# Patient Record
Sex: FEMALE | Race: BLACK OR AFRICAN AMERICAN | NOT HISPANIC OR LATINO | URBAN - METROPOLITAN AREA
[De-identification: names, ages, dates, MRNs, and addresses within clinical notes are randomized per-mention and may not be internally consistent; named-entity substitution may affect disease eponyms.]

---
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
## 2025-06-03 ENCOUNTER — HOSPITAL ENCOUNTER (EMERGENCY)
Facility: HOSPITAL | Age: 20
Discharge: HOME | End: 2025-06-03
Payer: MEDICAID

## 2025-06-03 VITALS
HEART RATE: 92 BPM | TEMPERATURE: 97.7 F | OXYGEN SATURATION: 100 % | RESPIRATION RATE: 16 BRPM | SYSTOLIC BLOOD PRESSURE: 125 MMHG | DIASTOLIC BLOOD PRESSURE: 81 MMHG

## 2025-06-03 DIAGNOSIS — Z3A.22 22 WEEKS GESTATION OF PREGNANCY (HHS-HCC): ICD-10-CM

## 2025-06-03 DIAGNOSIS — O99.612 CONSTIPATION DURING PREGNANCY IN SECOND TRIMESTER (HHS-HCC): ICD-10-CM

## 2025-06-03 DIAGNOSIS — K59.00 CONSTIPATION DURING PREGNANCY IN SECOND TRIMESTER (HHS-HCC): ICD-10-CM

## 2025-06-03 DIAGNOSIS — O23.42 URINARY TRACT INFECTION IN MOTHER DURING SECOND TRIMESTER OF PREGNANCY (HHS-HCC): Primary | ICD-10-CM

## 2025-06-03 DIAGNOSIS — O99.019 ANEMIA AFFECTING FIRST PREGNANCY: ICD-10-CM

## 2025-06-03 LAB
ABO GROUP (TYPE) IN BLOOD: NORMAL
ALBUMIN SERPL BCP-MCNC: 3.7 G/DL (ref 3.4–5)
ALP SERPL-CCNC: 45 U/L (ref 33–110)
ALT SERPL W P-5'-P-CCNC: 9 U/L (ref 7–45)
ANION GAP SERPL CALC-SCNC: 12 MMOL/L (ref 10–20)
ANTIBODY SCREEN: NORMAL
APPEARANCE UR: ABNORMAL
AST SERPL W P-5'-P-CCNC: 12 U/L (ref 9–39)
BACTERIA #/AREA URNS AUTO: ABNORMAL /HPF
BASOPHILS # BLD AUTO: 0.03 X10*3/UL (ref 0–0.1)
BASOPHILS NFR BLD AUTO: 0.2 %
BILIRUB SERPL-MCNC: 0.5 MG/DL (ref 0–1.2)
BILIRUB UR STRIP.AUTO-MCNC: NEGATIVE MG/DL
BUN SERPL-MCNC: 4 MG/DL (ref 6–23)
CALCIUM SERPL-MCNC: 9 MG/DL (ref 8.6–10.3)
CHLORIDE SERPL-SCNC: 104 MMOL/L (ref 98–107)
CO2 SERPL-SCNC: 21 MMOL/L (ref 21–32)
COLOR UR: ABNORMAL
CREAT SERPL-MCNC: 0.42 MG/DL (ref 0.5–1.05)
EGFRCR SERPLBLD CKD-EPI 2021: >90 ML/MIN/1.73M*2
EOSINOPHIL # BLD AUTO: 0.14 X10*3/UL (ref 0–0.7)
EOSINOPHIL NFR BLD AUTO: 1.1 %
ERYTHROCYTE [DISTWIDTH] IN BLOOD BY AUTOMATED COUNT: 14.4 % (ref 11.5–14.5)
GLUCOSE SERPL-MCNC: 89 MG/DL (ref 74–99)
GLUCOSE UR STRIP.AUTO-MCNC: NORMAL MG/DL
HCG UR QL IA.RAPID: POSITIVE
HCT VFR BLD AUTO: 31.6 % (ref 36–46)
HGB BLD-MCNC: 9.9 G/DL (ref 12–16)
HOLD SPECIMEN: NORMAL
IMM GRANULOCYTES # BLD AUTO: 0.09 X10*3/UL (ref 0–0.7)
IMM GRANULOCYTES NFR BLD AUTO: 0.7 % (ref 0–0.9)
KETONES UR STRIP.AUTO-MCNC: NEGATIVE MG/DL
LEUKOCYTE ESTERASE UR QL STRIP.AUTO: ABNORMAL
LYMPHOCYTES # BLD AUTO: 1.94 X10*3/UL (ref 1.2–4.8)
LYMPHOCYTES NFR BLD AUTO: 15.2 %
MAGNESIUM SERPL-MCNC: 1.53 MG/DL (ref 1.6–2.4)
MCH RBC QN AUTO: 27.5 PG (ref 26–34)
MCHC RBC AUTO-ENTMCNC: 31.3 G/DL (ref 32–36)
MCV RBC AUTO: 88 FL (ref 80–100)
MONOCYTES # BLD AUTO: 0.86 X10*3/UL (ref 0.1–1)
MONOCYTES NFR BLD AUTO: 6.8 %
NEUTROPHILS # BLD AUTO: 9.67 X10*3/UL (ref 1.2–7.7)
NEUTROPHILS NFR BLD AUTO: 76 %
NITRITE UR QL STRIP.AUTO: NEGATIVE
NRBC BLD-RTO: 0 /100 WBCS (ref 0–0)
PH UR STRIP.AUTO: 7 [PH]
PLATELET # BLD AUTO: 285 X10*3/UL (ref 150–450)
POTASSIUM SERPL-SCNC: 3.6 MMOL/L (ref 3.5–5.3)
PROT SERPL-MCNC: 7.1 G/DL (ref 6.4–8.2)
PROT UR STRIP.AUTO-MCNC: NEGATIVE MG/DL
RBC # BLD AUTO: 3.6 X10*6/UL (ref 4–5.2)
RBC # UR STRIP.AUTO: NEGATIVE MG/DL
RBC #/AREA URNS AUTO: ABNORMAL /HPF
RH FACTOR (ANTIGEN D): NORMAL
SODIUM SERPL-SCNC: 133 MMOL/L (ref 136–145)
SP GR UR STRIP.AUTO: 1
SQUAMOUS #/AREA URNS AUTO: ABNORMAL /HPF
UROBILINOGEN UR STRIP.AUTO-MCNC: NORMAL MG/DL
WBC # BLD AUTO: 12.7 X10*3/UL (ref 4.4–11.3)
WBC #/AREA URNS AUTO: ABNORMAL /HPF

## 2025-06-03 PROCEDURE — 81025 URINE PREGNANCY TEST: CPT | Performed by: NURSE PRACTITIONER

## 2025-06-03 PROCEDURE — 36415 COLL VENOUS BLD VENIPUNCTURE: CPT | Performed by: NURSE PRACTITIONER

## 2025-06-03 PROCEDURE — 96361 HYDRATE IV INFUSION ADD-ON: CPT

## 2025-06-03 PROCEDURE — 86901 BLOOD TYPING SEROLOGIC RH(D): CPT | Performed by: NURSE PRACTITIONER

## 2025-06-03 PROCEDURE — 2500000004 HC RX 250 GENERAL PHARMACY W/ HCPCS (ALT 636 FOR OP/ED): Performed by: NURSE PRACTITIONER

## 2025-06-03 PROCEDURE — 96360 HYDRATION IV INFUSION INIT: CPT

## 2025-06-03 PROCEDURE — 87086 URINE CULTURE/COLONY COUNT: CPT | Mod: PARLAB | Performed by: NURSE PRACTITIONER

## 2025-06-03 PROCEDURE — 83735 ASSAY OF MAGNESIUM: CPT | Performed by: NURSE PRACTITIONER

## 2025-06-03 PROCEDURE — 80053 COMPREHEN METABOLIC PANEL: CPT | Performed by: NURSE PRACTITIONER

## 2025-06-03 PROCEDURE — 99284 EMERGENCY DEPT VISIT MOD MDM: CPT | Mod: 25

## 2025-06-03 PROCEDURE — 85025 COMPLETE CBC W/AUTO DIFF WBC: CPT | Performed by: NURSE PRACTITIONER

## 2025-06-03 PROCEDURE — 2500000001 HC RX 250 WO HCPCS SELF ADMINISTERED DRUGS (ALT 637 FOR MEDICARE OP): Performed by: NURSE PRACTITIONER

## 2025-06-03 PROCEDURE — 81001 URINALYSIS AUTO W/SCOPE: CPT | Performed by: NURSE PRACTITIONER

## 2025-06-03 RX ORDER — CEPHALEXIN 500 MG/1
500 CAPSULE ORAL 2 TIMES DAILY
Qty: 14 CAPSULE | Refills: 0 | Status: SHIPPED | OUTPATIENT
Start: 2025-06-03 | End: 2025-06-10

## 2025-06-03 RX ORDER — CEPHALEXIN 500 MG/1
500 CAPSULE ORAL ONCE
Status: COMPLETED | OUTPATIENT
Start: 2025-06-03 | End: 2025-06-03

## 2025-06-03 RX ORDER — LANOLIN ALCOHOL/MO/W.PET/CERES
400 CREAM (GRAM) TOPICAL DAILY
Status: DISCONTINUED | OUTPATIENT
Start: 2025-06-03 | End: 2025-06-03 | Stop reason: HOSPADM

## 2025-06-03 RX ORDER — POLYETHYLENE GLYCOL 3350 17 G/17G
17 POWDER, FOR SOLUTION ORAL DAILY
Qty: 14 PACKET | Refills: 0 | Status: SHIPPED | OUTPATIENT
Start: 2025-06-03 | End: 2025-06-17

## 2025-06-03 RX ADMIN — MAGNESIUM OXIDE TAB 400 MG (241.3 MG ELEMENTAL MG) 1 TABLET: 400 (241.3 MG) TAB at 13:43

## 2025-06-03 RX ADMIN — CEPHALEXIN 500 MG: 500 CAPSULE ORAL at 13:43

## 2025-06-03 RX ADMIN — SODIUM CHLORIDE, SODIUM LACTATE, POTASSIUM CHLORIDE, AND CALCIUM CHLORIDE 1000 ML: .6; .31; .03; .02 INJECTION, SOLUTION INTRAVENOUS at 12:00

## 2025-06-03 NOTE — ED TRIAGE NOTES
Patient is  estimated 21 weeks with no previous prenatal care presents today reporting constipation. Last bowel movement last week. Denies any vaginal bleeding.

## 2025-06-03 NOTE — ED PROVIDER NOTES
Emergency Department Provider Note        History of Present Illness     History provided by: Patient  Limitations to History: None    HPI:  Kavita Iglesias is a 19 y.o. female with no significant past medical history reported presents emergency department today due to constipation.  Patient states that she currently about 21 weeks pregnant.  Her last menstrual period was 12-.  Based on this she is 22 weeks pregnant with an NIMCO of 10 - 3 - 25.  Patient is G1, P0.  Patient is complaining of constipation.  Patient states she has been having difficulty with bowel movements for the past few weeks.  She had 2 bowel movements last week and this week she has had to disimpact herself.  She also feels kind of dehydrated.  She denies any abdominal pain, vaginal bleeding, vaginal discharge.  She is sexually active with 1 partner.  She denies any gush of fluid or mucus discharge.  She denies any other complaints.    Physical Exam   Triage vitals:  T 36.5 °C (97.7 °F)  HR (!) 107  /81  RR 18  O2 100 % None (Room air)    Physical Exam  Vitals and nursing note reviewed.   Constitutional:       General: She is not in acute distress.     Appearance: She is well-developed.   HENT:      Head: Normocephalic and atraumatic.   Eyes:      Conjunctiva/sclera: Conjunctivae normal.   Cardiovascular:      Rate and Rhythm: Normal rate and regular rhythm.      Heart sounds: No murmur heard.  Pulmonary:      Effort: Pulmonary effort is normal. No respiratory distress.      Breath sounds: Normal breath sounds.   Abdominal:      Palpations: Abdomen is soft.      Tenderness: There is no abdominal tenderness.   Musculoskeletal:         General: No swelling.      Cervical back: Neck supple.   Skin:     General: Skin is warm and dry.      Capillary Refill: Capillary refill takes less than 2 seconds.   Neurological:      Mental Status: She is alert.   Psychiatric:         Mood and Affect: Mood normal.          Medical Decision  Making & ED Course   Medical Decision Makin y.o. female with no significant past medical history reported presents emergency department today due to constipation.  Patient states that she currently about 21 weeks pregnant.  Her last menstrual period was 2024.  Based on this she is 22 weeks pregnant with an NIMCO of 10 - 3 - 25.  Patient is G1, P0.  Patient is complaining of constipation.  Patient states she has been having difficulty with bowel movements for the past few weeks.  She had 2 bowel movements last week and this week she has had to disimpact herself.  She also feels kind of dehydrated.  She denies any abdominal pain, vaginal bleeding, vaginal discharge.  She is sexually active with 1 partner.  She denies any gush of fluid or mucus discharge.  She denies any other complaints.  Given patient's lack of prenatal care I did obtain basic lab work and urinalysis as well as urine pregnancy test.  Patient's urinalysis was positive for UTI.  She was treated with oral antibiotics for this.  Lab work did show her to have a slight hypomagnesemia which was repleted orally in the ED.  Patient also found to be anemic.  She was started on prenatal vitamins.  I did give her referral to OB/GYN advised her to follow-up as soon as possible.  Patient's fetal heart tones are normal.  She is still feeling fetal movement.  She was given IV fluids for slightly elevated heart rate consistent with mild dehydration.  She was able to move her bowels after this.  She was given MiraLAX as needed for potential constipation in the future.  ----      Differential diagnoses considered include but are not limited to: Constipation, HELLP, UTI, dehydration, pregnancy.     Social Determinants of Health which Significantly Impact Care: None identified     EKG Independent Interpretation: EKG not obtained    Independent Result Review and Interpretation: Relevant laboratory and radiographic results were reviewed and independently  interpreted by myself.  As necessary, they are commented on in the ED Course.    Chronic conditions affecting the patient's care: As documented above in [facility]    The patient was discussed with the following consultants/services: None    Care Considerations: As documented above in [facility]    ED Course:  ED Course as of 06/03/25 1421   Tue Jun 03, 2025   1230 Magnesium(!)  Slight hypomagnesemia, will replete orally [WS]   1230 Comprehensive Metabolic Panel(!)  CMP is stable, no electrolyte abnormalities, metabolic disorder, kidney injury, or elevated liver enzymes consistent with liver pathology. [WS]   1230 CBC and Auto Differential(!)  Leukocytosis, mild anemia, normal platelets. [WS]   1336 Patient's fetal heart tones were obtained and are normal. [WS]   1336 Microscopic Only, Urine(!)  UA concerning for UTI, [WS]   1348 hCG, Urine, Qualitative(!)  Patient is confirmed. [WS]      ED Course User Index  [WS] CATHERINE Cabrera         Diagnoses as of 06/03/25 1421   Urinary tract infection in mother during second trimester of pregnancy ([facility])   Constipation during pregnancy in second trimester ([facility])   Anemia affecting first pregnancy   22 weeks gestation of pregnancy ([facility])     Disposition   As a result of the work-up, the patient was discharged home.  she was informed of her diagnosis and instructed to come back with any concerns or worsening of condition.  she and was agreeable to the plan as discussed above.  she was given the opportunity to ask questions.  All of the patient's questions were answered.    Procedures   Procedures    Patient was seen independently    CATHERINE Cabrera  Emergency Medicine     CATHERINE Cabrera  06/03/25 1422

## 2025-06-03 NOTE — ED TRIAGE NOTES
TRIAGE NOTE   I saw the patient as the Clinician in Triage and performed a brief history and physical exam, established acuity, and ordered appropriate tests to develop basic plan of care. Patient will be seen by an [facility], resident and/or physician who will independently evaluate the patient. Please see subsequent provider notes for further details and disposition.     Brief HPI: In brief, Kavita Iglesias is a 19 y.o. female who is a G1, P0 approximately 21 weeks pregnant with no prenatal care presenting to ED today from home for evaluation of constipation.  Has had issues with constipation for the last 2 weeks, last bowel movement 5 days ago.  Denies abdominal/back pain, passing gas.  Positive fetal movement.  No loss of fluid, vaginal discharge or vaginal bleeding.  Denies fever/chills, cough/cold symptoms, chest pain, shortness of breath, nausea/vomiting, abdominal pain, urinary symptoms, change in bowel habits or any other complaints.  No smoking, EtOH or IV drugs.  Does not follow with OB yet.    Focused Physical exam:   General: 19-year-old female, awake and alert, orient x 3.  Well-nourished and hydrated.  Nontoxic looking.  Skin: Pink, warm and dry.  Cardiac: Mildly tachycardic at 107.  Pulmonary: Clear bilaterally.  Abdomen: Gravid uterus, soft, bowel sounds x 4.  Nontender.  No CVA tenderness.    Plan/MDM:   19-year-old approximately 21-week pregnant  was evaluated at the bedside for constipation, last normal bowel movement about 5 days ago.  Is passing gas, no abdominal pain.  Positive fetal movement, no loss of fluid or vaginal bleeding.  Has not followed with OB.  Vital signs within normal limits.  Afebrile.  Abdomen soft and nontender with bowel sounds.  Will obtain fetal heart tones.  Patient is agreeable to this plan.    Please see subsequent provider note for further details and disposition

## 2025-06-04 LAB — BACTERIA UR CULT: NORMAL

## 2025-06-19 ENCOUNTER — APPOINTMENT (OUTPATIENT)
Dept: PEDIATRIC HEMATOLOGY/ONCOLOGY | Facility: HOSPITAL | Age: 20
End: 2025-06-19
Payer: MEDICAID

## 2025-06-23 ENCOUNTER — APPOINTMENT (OUTPATIENT)
Dept: PRIMARY CARE | Facility: CLINIC | Age: 20
End: 2025-06-23
Payer: MEDICAID

## 2025-06-30 ENCOUNTER — APPOINTMENT (OUTPATIENT)
Dept: OBSTETRICS AND GYNECOLOGY | Facility: CLINIC | Age: 20
End: 2025-06-30
Payer: MEDICAID

## 2025-06-30 ENCOUNTER — APPOINTMENT (OUTPATIENT)
Facility: CLINIC | Age: 20
End: 2025-06-30
Payer: MEDICAID

## 2025-07-01 ENCOUNTER — APPOINTMENT (OUTPATIENT)
Dept: PRIMARY CARE | Facility: CLINIC | Age: 20
End: 2025-07-01
Payer: MEDICAID

## 2025-07-14 ENCOUNTER — APPOINTMENT (OUTPATIENT)
Dept: LAB | Facility: HOSPITAL | Age: 20
End: 2025-07-14
Payer: MEDICAID

## 2025-07-14 ENCOUNTER — APPOINTMENT (OUTPATIENT)
Dept: OBSTETRICS AND GYNECOLOGY | Facility: CLINIC | Age: 20
End: 2025-07-14
Payer: MEDICAID

## 2025-07-14 VITALS
DIASTOLIC BLOOD PRESSURE: 42 MMHG | BODY MASS INDEX: 40.42 KG/M2 | WEIGHT: 250.44 LBS | HEART RATE: 106 BPM | SYSTOLIC BLOOD PRESSURE: 99 MMHG

## 2025-07-14 DIAGNOSIS — Z36.0 ENCOUNTER FOR ANTENATAL SCREENING FOR CHROMOSOMAL ANOMALIES: ICD-10-CM

## 2025-07-14 DIAGNOSIS — D50.9 IRON DEFICIENCY ANEMIA DURING PREGNANCY: ICD-10-CM

## 2025-07-14 DIAGNOSIS — Z34.02 ENCOUNTER FOR SUPERVISION OF NORMAL PRIMIGRAVIDA IN SECOND TRIMESTER, ANTEPARTUM: Primary | ICD-10-CM

## 2025-07-14 DIAGNOSIS — Z34.92: ICD-10-CM

## 2025-07-14 DIAGNOSIS — O99.019 IRON DEFICIENCY ANEMIA DURING PREGNANCY: ICD-10-CM

## 2025-07-14 LAB
ERYTHROCYTE [DISTWIDTH] IN BLOOD BY AUTOMATED COUNT: 15.9 % (ref 11.5–14.5)
HCT VFR BLD AUTO: 31.7 % (ref 36–46)
HGB BLD-MCNC: 9.5 G/DL (ref 12–16)
MCH RBC QN AUTO: 27.2 PG (ref 26–34)
MCHC RBC AUTO-ENTMCNC: 30 G/DL (ref 32–36)
MCV RBC AUTO: 91 FL (ref 80–100)
NRBC BLD-RTO: 0 /100 WBCS (ref 0–0)
PLATELET # BLD AUTO: 290 X10*3/UL (ref 150–450)
RBC # BLD AUTO: 3.49 X10*6/UL (ref 4–5.2)
WBC # BLD AUTO: 11.4 X10*3/UL (ref 4.4–11.3)

## 2025-07-14 PROCEDURE — 99204 OFFICE O/P NEW MOD 45 MIN: CPT | Performed by: NURSE PRACTITIONER

## 2025-07-14 PROCEDURE — H1000 PRENATAL CARE ATRISK ASSESSM: HCPCS | Performed by: NURSE PRACTITIONER

## 2025-07-14 PROCEDURE — 86901 BLOOD TYPING SEROLOGIC RH(D): CPT

## 2025-07-14 PROCEDURE — 82746 ASSAY OF FOLIC ACID SERUM: CPT

## 2025-07-14 PROCEDURE — 85027 COMPLETE CBC AUTOMATED: CPT

## 2025-07-14 PROCEDURE — 83550 IRON BINDING TEST: CPT

## 2025-07-14 PROCEDURE — 82607 VITAMIN B-12: CPT

## 2025-07-14 PROCEDURE — 82728 ASSAY OF FERRITIN: CPT

## 2025-07-14 PROCEDURE — 86900 BLOOD TYPING SEROLOGIC ABO: CPT

## 2025-07-14 PROCEDURE — 83021 HEMOGLOBIN CHROMOTOGRAPHY: CPT

## 2025-07-14 PROCEDURE — 99214 OFFICE O/P EST MOD 30 MIN: CPT | Performed by: NURSE PRACTITIONER

## 2025-07-14 PROCEDURE — 86850 RBC ANTIBODY SCREEN: CPT

## 2025-07-14 RX ORDER — NAPROXEN SODIUM 220 MG/1
162 TABLET, FILM COATED ORAL DAILY
Qty: 60 TABLET | Refills: 5 | Status: SHIPPED | OUTPATIENT
Start: 2025-07-14

## 2025-07-14 ASSESSMENT — EDINBURGH POSTNATAL DEPRESSION SCALE (EPDS)
TOTAL SCORE: 14
I HAVE LOOKED FORWARD WITH ENJOYMENT TO THINGS: AS MUCH AS I EVER DID
I HAVE BEEN ABLE TO LAUGH AND SEE THE FUNNY SIDE OF THINGS: AS MUCH AS I ALWAYS COULD
I HAVE BLAMED MYSELF UNNECESSARILY WHEN THINGS WENT WRONG: YES, SOME OF THE TIME
I HAVE FELT SCARED OR PANICKY FOR NO GOOD REASON: YES, SOMETIMES
I HAVE BEEN SO UNHAPPY THAT I HAVE HAD DIFFICULTY SLEEPING: NOT VERY OFTEN
THE THOUGHT OF HARMING MYSELF HAS OCCURRED TO ME: NEVER
THINGS HAVE BEEN GETTING ON TOP OF ME: YES, SOMETIMES I HAVEN'T BEEN COPING AS WELL AS USUAL
I HAVE FELT SAD OR MISERABLE: YES, MOST OF THE TIME
I HAVE BEEN SO UNHAPPY THAT I HAVE BEEN CRYING: YES, QUITE OFTEN
I HAVE BEEN ANXIOUS OR WORRIED FOR NO GOOD REASON: YES, SOMETIMES

## 2025-07-14 ASSESSMENT — PAIN - FUNCTIONAL ASSESSMENT: PAIN_FUNCTIONAL_ASSESSMENT: 0-10

## 2025-07-14 ASSESSMENT — PAIN SCALES - GENERAL: PAINLEVEL_OUTOF10: 0 - NO PAIN

## 2025-07-14 NOTE — LETTER
7/14/2025    To Whom It May Concern:    This is to certify that my patient,Kavita Iglesias is under my care for her pregnancy.    The following guidelines may be used for any dental work:  You may use a local anesthetic with or without Epinephrine.  You may prescribe any Penicillin or Cephalosporin if an antibiotic is necessary. (Dependent on allergy history)  You may take x-rays with a lead apron if necessary.  You may prescribe Tylenol and/or narcotics for pain.  You may extract teeth if necessary.    If you need further information or if you have any concerns, please contact our office.    Sincerely,        Zora Menjivar, APRN-CNM, APRN-CNP   [facility] [place]

## 2025-07-14 NOTE — LETTER
7/14/2025    To Whom It May Concern:    Kavita Iglesias is being followed for her pregnancy at [facility].  Estimated Date of Delivery: 11/2/25    Sincerely,        Zora Menjivar, APRN-CNM, APRN-CNP  [facility]

## 2025-07-14 NOTE — PATIENT INSTRUCTIONS
TAKING GOOD CARE OF YOURSELF WHILE YOU ARE PREGNANT    What Should I Eat?  You do not have to eat a lot more food during pregnancy. But it is important to eat the right food--the most  healthy food for you and your baby. Every day, make sure you have:  6 to 8 large glasses of water.  6 to 9 servings of whole grain foods like bread or pasta. By reading the label, you will know that you are getting ‘‘whole’’ grain and not just brown-colored bread or pasta (1 slice of bread or a half cup of cooked pasta is a serving).  3 to 4 servings of fruit. Fresh, raw fruit is best (1 small apple or a half cup of chopped fruit is a serving).  4 to 5 servings of vegetables (1 medium carrot or a half cup of chopped vegetables is a serving).  2 to 3 servings of lean meat, fish, eggs, or nuts. (A piece ofmeat the size of a pack of playing cards is 1 serving.)  1 serving of vitamin C-rich food, like oranges, sweet peppers, or tomatoes (one half cup is a serving).  2 to 3 servings of iron-rich foods, like black-eyed peas, sweet potatoes, greens, dried fruit, or meat.  1 serving of a food rich in folic acid, like dark green, leafy vegetables (one half cup is a serving).    Are Some Foods Dangerous?  Most women can eat any food they want while they are pregnant. But there are some foods that can be  dangerous to the health of your baby.    Fish -- Fish is good food. And it is an important food for growing a smart baby. But some fish have lots of dangerous chemicals. To avoid these chemicals:  Do not eat swordfish, shark, akbar mackerel, or tilefish.  Eat salmon no more than 1 time per week.  Eat only ‘‘light’’ tuna. Do not eat albacore tuna.    Milk and cheese -- Dairy products are an important source of calcium, and calcium helps build strong bones and teeth. But some dairy products carry dangerous germs. To keep yourself and your baby safe, eat and drink only dairy products--such as milk, yogurt, and cheese--that are  pasteurized.    Prepared foods -- Any food that is spoiled or not cooked well can make you sick.  Do not eat any meat or fish that has not been cooked all the way through.  Do not eat any cooked food that has not been kept hot or chilled.  Wash knives, cutting boards, and your hands between handling raw meat and any other food--like fruits and vegetables--that you plan to eat raw.  Wash all fruits and vegetables with 1 tablespoon of vinegar in a pan of water to kill germs before you eat them.    Alcohol -- We know that alcohol is dangerous for your baby if you drink a lot during your pregnancy. It is safest to avoid all alcohol.    Coffee -- The most recent studies say that 2 cups of caffeinated drink each day is safe during pregnancy. This means 2 small cups of coffee or tea or 1 can of caffeinated soda.    Weight Gain  On average, the total amount of weight gain during pregnancy will fall in the following ranges:    Weight Type Average Pounds   Normal weight (BMI 18.5 - 24.9) 25 - 35 pounds   Underweight (BMI less than 18.5) 28 - 40 pounds   Overweight (BMI 25 - 29.9) 15 - 25 pounds   Obese (BMI greater or equal to 30) 11 - 20 pounds       Do I Need to Take Vitamins?  Even if your diet is good, a daily multivitamin is a good idea. All prenatal vitamins are pretty much the same,  so buy the cheapest kind. If you find that your vitamins upset your stomach, take a children’s chewable or gummy  vitamin. Be sure you get at least 400 micrograms of folic acid every day in the vitamin you chose. The number  of micrograms of folic acid is on the label of the bottle.    Is Exercise Important?  Yes! You are getting ready for an athletic event: labor! Daily exercise will help you stay fit, control your  weight, and be prepared for labor. Every day, try to get at least 30 minutes of moderate exercise like walking  or swimming. Do deep squats several times a day. This exercise will help control low back pain and help  prepare  your pelvis for delivery.    Are Some Exercises Dangerous?  You can continue to do pretty much any exercise you have been doing. It is important to avoid any danger of  blows to your stomach. You should avoid scuba diving and contact sports.    What if I Get Sick--Can I Take Medicine?  It is important to limit the medicines you take as much as possible. It is safe to take acetaminophen  (Tylenol). Avoid NSAIDs like ibuprofen (Motrin) and naproxen (Aleve).    Head cold -- Drink lots of fluids, gargle with warm salt water, take warm baths or showers, take Tylenol for headache and sore throat, suck on throat lozenges    Headaches -- Drink at least 8 big glasses of water every day, eat something healthy every 2 to 3 hours during the day, and take Tylenol    Constipation -- Drink lots of water, eat lots of fruits and vegetables, including dried fruits like prunes, and use a fiber supplement like Metamucil    Are There Danger Signs That I Need to Watch Out For?  Call your health care provider if:  You start to bleed like a period  You are leaking fluid  Your baby is not moving (after 24 weeks into your pregnancy)  You are having very bad headaches or your vision is blurry or you see ‘‘spots’’  You are having very bad pain  You are feeling very frightened or sad  You are very worried about something    Our contact information is below in case you or your family need to call:  Your health care provider’s name: Zora KOLBY Menjivar, APRN-CNM, APRN-CNP  Your health care provider’s phone number: [phone]     What is a Midwife?    Midwives in the [place] provide health care services to women of all ages.Midwifemeans “with woman.”  Midwives partner with women to help make important health decisions. Midwives work with other members  of a woman’s health care team when needed, but a midwife may also be your primary care provider. There are  3 main types of midwives, and there are some differences in the services offered  by each type of midwife. At [facility], our practice consists of Certified-Nurse Midwives.    Certified Nurse-Midwives (CNMs)  Have a college degree in nursing and a master’s degree in nurse-midwifery. CNMs are registered nurses (RNs)  who have graduated from an accredited nurse-midwifery education programand passed a national certification  exam. They must have a license to practice midwifery in the state where they work. CNMs work in all health  care settings including hospitals, birth centers, and offices or clinics. CNMs provide general women’s health care  throughout a woman’s lifetime, and they can prescribe most medications.    What do midwives do?  Midwives provide care to women during pregnancy, labor, birth, and the postpartum period. They also provide general health services, annual checkups, contraception (birth control), menopausal care, and treatment for common infections and health problems. Midwives care for about one of every 10 women who give birth each year in the [place]. Most of these births are in hospitals.     Why would I choose a midwife for care during my pregnancy?  Midwives view pregnancy and birth as normal life events that can be a healthy time in your life. Midwives are experts in knowing the difference between normal changes that occur and symptoms that require extra attention. Midwives specialize in providing support and education in addition to regular health care. They use evidence-based medical procedures when there is a specific concern for the health of you or your baby, and they work in partnership with physicians who can be available if needed. Midwives offer health care that respects the goals and choices of each individual woman and family.    What if I have a high-risk pregnancy or complication during labor?  Your CNM can prescribe medicine, order tests, and provide treatment for common illnesses that you might get during pregnancy. Midwives work with  "physicians who specialize in complications of pregnancy. If you have a medical problem during pregnancy or complication during labor, your midwife will work with a physician to make sure you get the best and safest care for you and your baby. Midwives do not perform surgery. If you need to have a  birth, the surgery will be done by the physician who works with your midwife. Your midwife will also work with other health care providers: nurses, social workers, nutritionists, doulas, childbirth educators, physical therapists, and other specialists to help you get the care you need.    What if I want pain medicine during labor?  Your midwife will help you make decisions about how you are going to cope with your labor. If you want medicine to cope with pain during labor, your midwife can help you get medicine that is available in the setting  where you give birth. Midwives also know other ways help women cope with labor such as changing positions or being in a tub of water. These can be helpful in addition to pain medications.      Choices for care and hospital for birth:  The Certified Nurse Midwives (CNMs) at [facility] practice in a group setting, which means that any of the midwives in our group practice may be there for your birth.    We care for healthy females during pregnancy and labor/birth.  We practice in collaboration with physicians within our group.  If there are any concerns with your pregnancy, labor or birth, our physicians work closely with us.       There are certain medical conditions that \"risk you out\" of midwifery care that we are constantly assessing for.  Some conditions during your pregnancy that would risk you out of midwifery care would be:   Severe growth restriction of your baby   Labor/Birth  less than 35 weeks   Severe pre-eclampsia at any time during your pregnancy/labor/birth   Gestational Diabetes needing medication (insulin) to control your blood sugars   If " you decline or do not complete your glucose testing to rule out diet controlled diabetes by 32 weeks   If you are diagnosed with chronic hypertension and need to start medication to manage high blood pressures     We provide  Certified Nurse Midwifery coverage with a board certified OB/GYN, in house anesthesia and neonataology.            The Midwifery Service at [facility] has a Certified Nurse Midwife on call  who is available to discuss any pregnancy related concerns or urgent needs that cannot wait until the next business day.  Please call the office where you are seen at during the day.  On call numbers for after hours are listed below.    At any time you would like to tour our facilities, please call [phone] to set up a tour    If you are  (less than 37) weeks and experience:     More than 5 contractions in an 1 hour period   If you have fluid leaking from your vagina   Bleeding that is as heavy as a period   Decreased fetal movements or changes in your baby's movement after 24 weeks   A headache that does not go away with Tylenol or rest    If you are full term (37 weeks or greater) and experience:     Contractions that are 5 minutes a part for 2 hours that you cannot walk or talk through   A gush of fluid from your vagina   Bleeding that is as heavy as a period   Decrease fetal movements or changes in your baby's movements   A headache that does not go away with Tylenol or rest     During the weekday office hours please call the office you are normally seen at.    After hours please call:  For patients planning on birthing at [facility] or [facility] ([facility]) and need to speak to the midwife on call:  [phone]    For patients planning on birthing at [facility] and need to speak to the the midwife on call:  [phone]    [employer] Messages are not monitored . Please don't use this method of communication for any urgent or emergent concerns.

## 2025-07-14 NOTE — PROGRESS NOTES
Subjective   Kavita Iglesias is a 19 y.o.  at 24w1d with a working estimated date of delivery of 2025, by approximate Last Menstrual Period who presents for an initial prenatal visit. This pregnancy is unplanned but accepted..    Patient currently experiencing: constipation  Bleeding or cramping since LMP: no  Taking prenatal vitamin: Yes  Other concerns today: none  Ultrasound completed this pregnancy: No  Last pap: N/A    OB History    Para Term  AB Living   1        SAB IAB Ectopic Multiple Live Births             # Outcome Date GA Lbr Ken/2nd Weight Sex Type Anes PTL Lv   1 Current              Prior pregnancy complications: none  History of hypertension:  No    Medical History[1]   Surgical History[2]   Social History[3]   [place]  Depression Scale Total: 14    Objective   Physical Exam  Weight: 114 kg (250 lb 7 oz)  TW.8 kg (30 lb 7 oz)   Pregravid BMI: 35.53  BP: (!) 99/42    Physical Exam  Constitutional:       Appearance: Normal appearance.   Cardiovascular:      Rate and Rhythm: Normal rate and regular rhythm.   Pulmonary:      Effort: Pulmonary effort is normal.      Breath sounds: Normal breath sounds.   Abdominal:      Palpations: Abdomen is soft.      Tenderness: There is no abdominal tenderness.   Neurological:      General: No focal deficit present.      Mental Status: She is alert and oriented to person, place, and time. Mental status is at baseline.   Skin:     General: Skin is warm and dry.   Psychiatric:         Mood and Affect: Mood normal.         Behavior: Behavior normal.         Thought Content: Thought content normal.         Judgment: Judgment normal.          Assessment   Problem List Items Addressed This Visit          Pregnancy    Currently pregnant in second trimester with unknown gestational age ([facility])    Overview   Desired provider in labor: [] CNM (Birth Tracks [] )   [] Physician   [] Either Acceptable  [] Blood Products: [] Yes,  accepts [] No, needs counseling  [x] Initial BMI: 35.53   [] Prenatal Labs:   [x] Rh status: +  [] Screen for IPV and Substance Use Risk:  [] Genetic Screening (cfDNA):    [] First Trimester Anatomy Screen (11-13.6 wks):  [] Baby ASA (initiated):  [] Pregnancy dated by:      [] Anatomy US: (19-20 wks)  [] Federal Sterilization consent signed (if indicated):  [] 1hr GCT at 24-28wks:  [] Rhogam (if indicated):   [] Fetal Surveillance (if indicated):  [] Tdap (27-32 wks, may be given up to 36 wks if initial window missed):   [] RSV (32-36 wks) (Sept. to end ):      [] Feeding Intentions:  [] Pain management during labor:  [] Postpartum Birth control method:   [] GBS at 36 - 37 wks:  [] 39 weeks discussion of IOL vs. Expectant management:  [] Mode of delivery ( anticipated ):            Other Visit Diagnoses         Encounter for supervision of normal primigravida in second trimester, antepartum    -  Primary    Relevant Medications    prenatal vitamin, iron-folic, 27 mg iron-800 mcg folic acid tablet    aspirin 81 mg chewable tablet    Other Relevant Orders    US MAC OB imaging order    CBC Anemia Panel With Reflex    Hep B Core Ab, Total    Hep B surface Ag    Hep B Surface Ab    Hep C Ab    HIV 1/2 Screen with Reflex    Hgb  A1C    Hemoglobin identification with path review    Rubella Ab, IgG    Syphilis Screen with Reflex    Urine Culture clinic collect    C. trachomatis / N. gonorrhoeae, Amplified, Urogenital    Type And Screen Is this order related to pregnancy or an upcoming surgery? No      Encounter for  screening for chromosomal anomalies        Relevant Orders    Myriad Foresight Carrier Screen    Myriad Prequel Prenatal Screen             Plan   - New OB resources provided and reviewed with particular attention to dietary, travel, and medication restrictions  - Oriented to practice, CNM vs. MD care  - Reviewed IOM recommendations for weight gain given pt's BMI: 11-20 pounds (BMI greater than  or equal to 30)  - Reviewed bleeding precautions, warning signs, when to call provider; phone number provided  - Discussed bASA for PEC prophylaxis  - The following Rx were sent to pharmacy: PNV, bASA  - Routine NOB labs ordered  - NIPT discussed with patient: patient desires. Pre-test genetic counseling discussed and included:   Interpretation of family and medical histories to assess the probability of disease occurrence or recurrence;   Education about inheritance, genetic testing, disease management, prevention and resources  Counseling to promote informed choices and adaptation to the risk or presented of a genetic condition  Counseling for psychological aspects of genetic testing.  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 No pertinent surgical history.  [3]   Social History  Socioeconomic History    Marital status: Single     Spouse name: Venkatesh Lopez Jr.    Highest education level: Some college, no degree   Occupational History    Occupation: college student   Tobacco Use    Smoking status: Never    Smokeless tobacco: Never   Vaping Use    Vaping status: Never Used   Substance and Sexual Activity    Alcohol use: Never    Drug use: Never    Sexual activity: Yes     Partners: Male

## 2025-07-15 ENCOUNTER — HOSPITAL ENCOUNTER (OUTPATIENT)
Dept: RADIOLOGY | Facility: HOSPITAL | Age: 20
Discharge: HOME | End: 2025-07-15
Payer: MEDICAID

## 2025-07-15 DIAGNOSIS — Z34.02 ENCOUNTER FOR SUPERVISION OF NORMAL PRIMIGRAVIDA IN SECOND TRIMESTER, ANTEPARTUM: ICD-10-CM

## 2025-07-15 PROBLEM — O99.019 IRON DEFICIENCY ANEMIA DURING PREGNANCY: Status: ACTIVE | Noted: 2025-07-15

## 2025-07-15 PROBLEM — Z3A.29 29 WEEKS GESTATION OF PREGNANCY (HHS-HCC): Status: ACTIVE | Noted: 2025-07-14

## 2025-07-15 PROBLEM — D50.9 IRON DEFICIENCY ANEMIA DURING PREGNANCY: Status: ACTIVE | Noted: 2025-07-15

## 2025-07-15 LAB
ABO GROUP (TYPE) IN BLOOD: NORMAL
ANTIBODY SCREEN: NORMAL
FERRITIN SERPL-MCNC: 14 NG/ML
FOLATE SERPL-MCNC: >24 NG/ML
HEMOGLOBIN A2: 2.8 % (ref 2–3.5)
HEMOGLOBIN A: 96.9 % (ref 95.8–98)
HEMOGLOBIN F: 0.3 % (ref 0–2)
HEMOGLOBIN IDENTIFICATION INTERPRETATION: NORMAL
IRON SATN MFR SERPL: 21 %
IRON SERPL-MCNC: 101 UG/DL
PATH REVIEW-HGB IDENTIFICATION: NORMAL
REFLEX ADDED, ANEMIA PANEL: NORMAL
RH FACTOR (ANTIGEN D): NORMAL
TIBC SERPL-MCNC: 489 UG/DL
UIBC SERPL-MCNC: 388 UG/DL
VIT B12 SERPL-MCNC: 256 PG/ML

## 2025-07-15 PROCEDURE — 76811 OB US DETAILED SNGL FETUS: CPT

## 2025-07-15 PROCEDURE — 76819 FETAL BIOPHYS PROFIL W/O NST: CPT

## 2025-07-15 RX ORDER — FERROUS SULFATE 325(65) MG
TABLET ORAL
Qty: 60 TABLET | Refills: 3 | Status: SHIPPED | OUTPATIENT
Start: 2025-07-15

## 2025-07-16 ENCOUNTER — RESULTS FOLLOW-UP (OUTPATIENT)
Dept: OBSTETRICS AND GYNECOLOGY | Facility: CLINIC | Age: 20
End: 2025-07-16
Payer: MEDICAID

## 2025-07-16 LAB
BACTERIA UR CULT: NORMAL
C TRACH RRNA SPEC QL NAA+PROBE: NOT DETECTED
N GONORRHOEA RRNA SPEC QL NAA+PROBE: NOT DETECTED
QUEST GC CT AMPLIFIED (ALWAYS MESSAGE): NORMAL

## 2025-07-16 NOTE — TELEPHONE ENCOUNTER
Pt notified of need for iron, side effects taking with oj and not with milk products or at same time as pnv. Discussed high iron foods  ----- Message from Zora Menjivar sent at 7/15/2025  5:30 PM EDT -----  Please call Kavita to discuss anemia and low iron. PO iron and colace will be sent to pharmacy.  ----- Message -----  From: Lab, Background User  Sent: 7/14/2025  11:20 PM EDT  To: Zora Menjivar V, APRN-CNM, APRN-CNP

## 2025-07-17 LAB
EST. AVERAGE GLUCOSE BLD GHB EST-MCNC: 85 MG/DL
EST. AVERAGE GLUCOSE BLD GHB EST-SCNC: 4.7 MMOL/L
HBA1C MFR BLD: 4.6 %
HBV CORE AB SERPL QL IA: NORMAL
HBV SURFACE AB SERPL IA-ACNC: 25 MIU/ML
HBV SURFACE AG SERPL QL IA: NORMAL
HCV AB SERPL QL IA: NORMAL
HIV 1+2 AB+HIV1 P24 AG SERPL QL IA: NORMAL
HIV 1+2 AB+HIV1 P24 AG SERPL QL IA: NORMAL
RUBV IGG SERPL IA-ACNC: 14.9 INDEX
T PALLIDUM AB SER QL IA: NEGATIVE

## 2025-07-21 LAB
COMMENTS - MP RESULT TYPE: NORMAL
SCAN RESULT: NORMAL

## 2025-07-26 LAB
COMMENTS - MP RESULT TYPE: NORMAL
SCAN RESULT: NORMAL

## 2025-07-29 ENCOUNTER — APPOINTMENT (OUTPATIENT)
Dept: OBSTETRICS AND GYNECOLOGY | Facility: CLINIC | Age: 20
End: 2025-07-29
Payer: MEDICAID

## 2025-08-06 PROBLEM — O09.30 LIMITED PRENATAL CARE, ANTEPARTUM: Status: ACTIVE | Noted: 2025-08-06

## 2025-08-06 PROBLEM — Z3A.32 32 WEEKS GESTATION OF PREGNANCY (HHS-HCC): Status: ACTIVE | Noted: 2025-07-14

## 2025-08-06 PROBLEM — Z14.8 GENETIC CARRIER: Status: ACTIVE | Noted: 2025-08-06

## 2025-08-07 ENCOUNTER — APPOINTMENT (OUTPATIENT)
Dept: OBSTETRICS AND GYNECOLOGY | Facility: CLINIC | Age: 20
End: 2025-08-07
Payer: MEDICAID

## 2025-08-28 ENCOUNTER — TELEPHONE (OUTPATIENT)
Dept: OBSTETRICS AND GYNECOLOGY | Facility: CLINIC | Age: 20
End: 2025-08-28
Payer: MEDICAID

## 2025-09-02 ENCOUNTER — HOSPITAL ENCOUNTER (OUTPATIENT)
Facility: HOSPITAL | Age: 20
Discharge: HOME | End: 2025-09-02
Attending: OBSTETRICS & GYNECOLOGY | Admitting: OBSTETRICS & GYNECOLOGY
Payer: MEDICAID

## 2025-09-02 ENCOUNTER — ROUTINE PRENATAL (OUTPATIENT)
Dept: OBSTETRICS AND GYNECOLOGY | Facility: CLINIC | Age: 20
End: 2025-09-02
Payer: MEDICAID

## 2025-09-02 VITALS
DIASTOLIC BLOOD PRESSURE: 60 MMHG | HEIGHT: 66 IN | OXYGEN SATURATION: 100 % | SYSTOLIC BLOOD PRESSURE: 126 MMHG | RESPIRATION RATE: 18 BRPM | TEMPERATURE: 98.1 F | HEART RATE: 97 BPM | WEIGHT: 270.06 LBS | BODY MASS INDEX: 43.4 KG/M2

## 2025-09-02 VITALS
WEIGHT: 271.3 LBS | SYSTOLIC BLOOD PRESSURE: 116 MMHG | HEART RATE: 93 BPM | BODY MASS INDEX: 43.79 KG/M2 | DIASTOLIC BLOOD PRESSURE: 71 MMHG

## 2025-09-02 DIAGNOSIS — D50.9 IRON DEFICIENCY ANEMIA DURING PREGNANCY: ICD-10-CM

## 2025-09-02 DIAGNOSIS — G56.00 CARPAL TUNNEL SYNDROME DURING PREGNANCY (HHS-HCC): ICD-10-CM

## 2025-09-02 DIAGNOSIS — O99.213 SEVERE OBESITY DUE TO EXCESS CALORIES AFFECTING PREGNANCY IN THIRD TRIMESTER (MULTI): ICD-10-CM

## 2025-09-02 DIAGNOSIS — Z3A.36 36 WEEKS GESTATION OF PREGNANCY (HHS-HCC): Primary | ICD-10-CM

## 2025-09-02 DIAGNOSIS — O28.3 ABNORMAL PREGNANCY US: ICD-10-CM

## 2025-09-02 DIAGNOSIS — O28.5 ABNORMAL GENETIC TEST DURING PREGNANCY: ICD-10-CM

## 2025-09-02 DIAGNOSIS — O99.019 IRON DEFICIENCY ANEMIA DURING PREGNANCY: ICD-10-CM

## 2025-09-02 DIAGNOSIS — O09.33 INSUFFICIENT PRENATAL CARE IN THIRD TRIMESTER (HHS-HCC): ICD-10-CM

## 2025-09-02 DIAGNOSIS — E66.01 SEVERE OBESITY DUE TO EXCESS CALORIES AFFECTING PREGNANCY IN THIRD TRIMESTER (MULTI): ICD-10-CM

## 2025-09-02 DIAGNOSIS — O26.899 CARPAL TUNNEL SYNDROME DURING PREGNANCY (HHS-HCC): ICD-10-CM

## 2025-09-02 DIAGNOSIS — Z71.85 VACCINE COUNSELING: ICD-10-CM

## 2025-09-02 DIAGNOSIS — Z59.819 HOUSING INSECURITY: ICD-10-CM

## 2025-09-02 DIAGNOSIS — H93.8X3 SENSATION OF FULLNESS IN BOTH EARS: ICD-10-CM

## 2025-09-02 PROBLEM — M25.561 RIGHT KNEE PAIN: Status: RESOLVED | Noted: 2024-03-01 | Resolved: 2025-09-02

## 2025-09-02 PROCEDURE — 59025 FETAL NON-STRESS TEST: CPT | Performed by: OBSTETRICS & GYNECOLOGY

## 2025-09-02 PROCEDURE — 99213 OFFICE O/P EST LOW 20 MIN: CPT

## 2025-09-02 PROCEDURE — 99213 OFFICE O/P EST LOW 20 MIN: CPT | Performed by: OBSTETRICS & GYNECOLOGY

## 2025-09-02 PROCEDURE — 99214 OFFICE O/P EST MOD 30 MIN: CPT

## 2025-09-02 PROCEDURE — 99214 OFFICE O/P EST MOD 30 MIN: CPT | Mod: 25

## 2025-09-02 PROCEDURE — 90678 RSV VACC PREF BIVALENT IM: CPT | Mod: GC

## 2025-09-02 PROCEDURE — 90715 TDAP VACCINE 7 YRS/> IM: CPT | Mod: GC

## 2025-09-02 RX ORDER — ONDANSETRON 4 MG/1
4 TABLET, FILM COATED ORAL EVERY 6 HOURS PRN
Status: DISCONTINUED | OUTPATIENT
Start: 2025-09-02 | End: 2025-09-03 | Stop reason: HOSPADM

## 2025-09-02 RX ORDER — ONDANSETRON HYDROCHLORIDE 2 MG/ML
4 INJECTION, SOLUTION INTRAVENOUS EVERY 6 HOURS PRN
Status: DISCONTINUED | OUTPATIENT
Start: 2025-09-02 | End: 2025-09-03 | Stop reason: HOSPADM

## 2025-09-02 RX ORDER — LABETALOL HYDROCHLORIDE 5 MG/ML
20 INJECTION, SOLUTION INTRAVENOUS ONCE AS NEEDED
Status: DISCONTINUED | OUTPATIENT
Start: 2025-09-02 | End: 2025-09-03 | Stop reason: HOSPADM

## 2025-09-02 RX ORDER — LIDOCAINE HYDROCHLORIDE 10 MG/ML
0.5 INJECTION, SOLUTION EPIDURAL; INFILTRATION; INTRACAUDAL; PERINEURAL ONCE AS NEEDED
Status: DISCONTINUED | OUTPATIENT
Start: 2025-09-02 | End: 2025-09-03 | Stop reason: HOSPADM

## 2025-09-02 RX ORDER — HYDRALAZINE HYDROCHLORIDE 20 MG/ML
5 INJECTION INTRAMUSCULAR; INTRAVENOUS ONCE AS NEEDED
Status: DISCONTINUED | OUTPATIENT
Start: 2025-09-02 | End: 2025-09-03 | Stop reason: HOSPADM

## 2025-09-02 SDOH — SOCIAL STABILITY: SOCIAL INSECURITY: HAS ANYONE EVER THREATENED TO HURT YOUR FAMILY OR YOUR PETS?: NO

## 2025-09-02 SDOH — SOCIAL STABILITY: SOCIAL INSECURITY: ARE THERE ANY APPARENT SIGNS OF INJURIES/BEHAVIORS THAT COULD BE RELATED TO ABUSE/NEGLECT?: NO

## 2025-09-02 SDOH — HEALTH STABILITY: MENTAL HEALTH: SUICIDAL BEHAVIOR (LIFETIME): NO

## 2025-09-02 SDOH — HEALTH STABILITY: MENTAL HEALTH: WISH TO BE DEAD (PAST 1 MONTH): NO

## 2025-09-02 SDOH — HEALTH STABILITY: MENTAL HEALTH: WERE YOU ABLE TO COMPLETE ALL THE BEHAVIORAL HEALTH SCREENINGS?: NO

## 2025-09-02 SDOH — SOCIAL STABILITY: SOCIAL INSECURITY: DO YOU FEEL ANYONE HAS EXPLOITED OR TAKEN ADVANTAGE OF YOU FINANCIALLY OR OF YOUR PERSONAL PROPERTY?: NO

## 2025-09-02 SDOH — HEALTH STABILITY: MENTAL HEALTH: NON-SPECIFIC ACTIVE SUICIDAL THOUGHTS (PAST 1 MONTH): NO

## 2025-09-02 SDOH — SOCIAL STABILITY: SOCIAL INSECURITY: HAVE YOU HAD THOUGHTS OF HARMING ANYONE ELSE?: NO

## 2025-09-02 SDOH — SOCIAL STABILITY: SOCIAL INSECURITY: ARE YOU OR HAVE YOU BEEN THREATENED OR ABUSED PHYSICALLY, EMOTIONALLY, OR SEXUALLY BY ANYONE?: NO

## 2025-09-02 SDOH — ECONOMIC STABILITY - HOUSING INSECURITY: HOUSING INSTABILITY UNSPECIFIED: Z59.819

## 2025-09-02 SDOH — SOCIAL STABILITY: SOCIAL INSECURITY: PHYSICAL ABUSE: DENIES

## 2025-09-02 SDOH — SOCIAL STABILITY: SOCIAL INSECURITY: DOES ANYONE TRY TO KEEP YOU FROM HAVING/CONTACTING OTHER FRIENDS OR DOING THINGS OUTSIDE YOUR HOME?: NO

## 2025-09-02 SDOH — ECONOMIC STABILITY: HOUSING INSECURITY: DO YOU FEEL UNSAFE GOING BACK TO THE PLACE WHERE YOU ARE LIVING?: NO

## 2025-09-02 SDOH — SOCIAL STABILITY: SOCIAL INSECURITY: HAVE YOU HAD ANY THOUGHTS OF HARMING ANYONE ELSE?: NO

## 2025-09-02 SDOH — SOCIAL STABILITY: SOCIAL INSECURITY: ABUSE SCREEN: ADULT

## 2025-09-02 SDOH — SOCIAL STABILITY: SOCIAL INSECURITY: VERBAL ABUSE: DENIES

## 2025-09-02 ASSESSMENT — LIFESTYLE VARIABLES
SKIP TO QUESTIONS 9-10: 1
HOW OFTEN DO YOU HAVE A DRINK CONTAINING ALCOHOL: NEVER
AUDIT-C TOTAL SCORE: 0
HOW OFTEN DO YOU HAVE 6 OR MORE DRINKS ON ONE OCCASION: NEVER
AUDIT-C TOTAL SCORE: 0
HOW MANY STANDARD DRINKS CONTAINING ALCOHOL DO YOU HAVE ON A TYPICAL DAY: PATIENT DOES NOT DRINK

## 2025-09-02 ASSESSMENT — ENCOUNTER SYMPTOMS
NEUROLOGICAL NEGATIVE: 0
ENDOCRINE NEGATIVE: 0
GASTROINTESTINAL NEGATIVE: 0
PSYCHIATRIC NEGATIVE: 0
EYES NEGATIVE: 0
RESPIRATORY NEGATIVE: 0
HEMATOLOGIC/LYMPHATIC NEGATIVE: 0
MUSCULOSKELETAL NEGATIVE: 0
ALLERGIC/IMMUNOLOGIC NEGATIVE: 0
CARDIOVASCULAR NEGATIVE: 0
CONSTITUTIONAL NEGATIVE: 0

## 2025-09-02 ASSESSMENT — PATIENT HEALTH QUESTIONNAIRE - PHQ9
SUM OF ALL RESPONSES TO PHQ9 QUESTIONS 1 & 2: 1
SUM OF ALL RESPONSES TO PHQ9 QUESTIONS 1 AND 2: 1
2. FEELING DOWN, DEPRESSED OR HOPELESS: SEVERAL DAYS
1. LITTLE INTEREST OR PLEASURE IN DOING THINGS: NOT AT ALL
1. LITTLE INTEREST OR PLEASURE IN DOING THINGS: NOT AT ALL
2. FEELING DOWN, DEPRESSED OR HOPELESS: SEVERAL DAYS

## 2025-09-02 ASSESSMENT — PAIN SCALES - GENERAL
PAINLEVEL_OUTOF10: 0 - NO PAIN
PAINLEVEL_OUTOF10: 3

## 2025-09-02 ASSESSMENT — PAIN - FUNCTIONAL ASSESSMENT: PAIN_FUNCTIONAL_ASSESSMENT: 0-10

## 2025-09-04 ENCOUNTER — APPOINTMENT (OUTPATIENT)
Dept: OBSTETRICS AND GYNECOLOGY | Facility: CLINIC | Age: 20
End: 2025-09-04
Payer: MEDICAID

## 2025-09-04 LAB — GP B STREP SPEC QL CULT: ABNORMAL

## 2025-09-05 ENCOUNTER — PROCEDURE VISIT (OUTPATIENT)
Dept: OBSTETRICS AND GYNECOLOGY | Facility: CLINIC | Age: 20
End: 2025-09-05
Payer: MEDICAID

## 2025-09-05 VITALS
SYSTOLIC BLOOD PRESSURE: 129 MMHG | WEIGHT: 269.6 LBS | DIASTOLIC BLOOD PRESSURE: 79 MMHG | BODY MASS INDEX: 43.51 KG/M2 | HEART RATE: 106 BPM

## 2025-09-05 DIAGNOSIS — O99.019 IRON DEFICIENCY ANEMIA DURING PREGNANCY: ICD-10-CM

## 2025-09-05 DIAGNOSIS — D50.9 IRON DEFICIENCY ANEMIA DURING PREGNANCY: ICD-10-CM

## 2025-09-05 DIAGNOSIS — O26.899 CARPAL TUNNEL SYNDROME DURING PREGNANCY (HHS-HCC): ICD-10-CM

## 2025-09-05 DIAGNOSIS — O09.33 INSUFFICIENT PRENATAL CARE IN THIRD TRIMESTER (HHS-HCC): ICD-10-CM

## 2025-09-05 DIAGNOSIS — Z3A.36 36 WEEKS GESTATION OF PREGNANCY (HHS-HCC): Primary | ICD-10-CM

## 2025-09-05 DIAGNOSIS — O99.210 OBESITY IN PREGNANCY (HHS-HCC): ICD-10-CM

## 2025-09-05 DIAGNOSIS — G56.00 CARPAL TUNNEL SYNDROME DURING PREGNANCY (HHS-HCC): ICD-10-CM

## 2025-11-03 ENCOUNTER — APPOINTMENT (OUTPATIENT)
Dept: PRIMARY CARE | Facility: CLINIC | Age: 20
End: 2025-11-03
Payer: MEDICAID